# Patient Record
Sex: FEMALE | Race: WHITE | Employment: PART TIME | URBAN - METROPOLITAN AREA
[De-identification: names, ages, dates, MRNs, and addresses within clinical notes are randomized per-mention and may not be internally consistent; named-entity substitution may affect disease eponyms.]

---

## 2017-04-05 ENCOUNTER — OFFICE VISIT (OUTPATIENT)
Dept: FAMILY MEDICINE CLINIC | Age: 57
End: 2017-04-05

## 2017-04-05 VITALS
BODY MASS INDEX: 35.07 KG/M2 | HEART RATE: 85 BPM | SYSTOLIC BLOOD PRESSURE: 136 MMHG | DIASTOLIC BLOOD PRESSURE: 88 MMHG | WEIGHT: 236.8 LBS | TEMPERATURE: 98.1 F | HEIGHT: 69 IN

## 2017-04-05 DIAGNOSIS — Z11.4 SCREENING FOR HIV WITHOUT PRESENCE OF RISK FACTORS: ICD-10-CM

## 2017-04-05 DIAGNOSIS — Z00.00 WELLNESS EXAMINATION: ICD-10-CM

## 2017-04-05 DIAGNOSIS — Z12.11 SCREENING FOR COLON CANCER: ICD-10-CM

## 2017-04-05 DIAGNOSIS — R79.89 LOW VITAMIN D LEVEL: ICD-10-CM

## 2017-04-05 DIAGNOSIS — Z11.59 NEED FOR HEPATITIS C SCREENING TEST: ICD-10-CM

## 2017-04-05 DIAGNOSIS — Z23 NEED FOR PROPHYLACTIC VACCINATION AGAINST STREPTOCOCCUS PNEUMONIAE (PNEUMOCOCCUS): Primary | ICD-10-CM

## 2017-04-05 DIAGNOSIS — Z83.3 FAMILY HISTORY OF DIABETES MELLITUS TYPE I: ICD-10-CM

## 2017-04-05 PROCEDURE — 90732 PPSV23 VACC 2 YRS+ SUBQ/IM: CPT | Performed by: NURSE PRACTITIONER

## 2017-04-05 PROCEDURE — 90471 IMMUNIZATION ADMIN: CPT | Performed by: NURSE PRACTITIONER

## 2017-04-05 PROCEDURE — 99396 PREV VISIT EST AGE 40-64: CPT | Performed by: NURSE PRACTITIONER

## 2017-04-05 ASSESSMENT — ENCOUNTER SYMPTOMS
EYE DISCHARGE: 0
SORE THROAT: 0
EYE PAIN: 0
COUGH: 0
NAUSEA: 0
TROUBLE SWALLOWING: 0
PHOTOPHOBIA: 0
VOMITING: 0
CONSTIPATION: 0
BLOOD IN STOOL: 0
ABDOMINAL PAIN: 0
SHORTNESS OF BREATH: 0
DIARRHEA: 0
ABDOMINAL DISTENTION: 0

## 2017-04-12 ENCOUNTER — NURSE ONLY (OUTPATIENT)
Dept: FAMILY MEDICINE CLINIC | Age: 57
End: 2017-04-12

## 2017-04-12 ENCOUNTER — TELEPHONE (OUTPATIENT)
Dept: FAMILY MEDICINE CLINIC | Age: 57
End: 2017-04-12

## 2017-04-12 DIAGNOSIS — Z00.00 WELLNESS EXAMINATION: ICD-10-CM

## 2017-04-12 DIAGNOSIS — Z83.3 FAMILY HISTORY OF DIABETES MELLITUS TYPE I: ICD-10-CM

## 2017-04-12 DIAGNOSIS — Z00.00 WELLNESS EXAMINATION: Primary | ICD-10-CM

## 2017-04-12 LAB
ALBUMIN SERPL-MCNC: 4.3 GM/DL (ref 3.5–5.1)
ALP BLD-CCNC: 96 U/L (ref 38–126)
ALT SERPL-CCNC: 19 U/L (ref 11–66)
ANION GAP SERPL CALCULATED.3IONS-SCNC: 17 MEQ/L (ref 8–16)
AST SERPL-CCNC: 20 U/L (ref 5–40)
AVERAGE GLUCOSE: 102 MG/DL (ref 70–126)
BACTERIA: NORMAL
BANDED NEUTROPHILS ABSOLUTE COUNT: 0.1 THOU/MM3
BANDS PRESENT: 2 %
BASOPHILS # BLD: 0 %
BASOPHILS ABSOLUTE: 0 THOU/MM3 (ref 0–0.1)
BILIRUB SERPL-MCNC: 0.3 MG/DL (ref 0.3–1.2)
BILIRUBIN URINE: NEGATIVE
BLOOD, URINE: NEGATIVE
BUN BLDV-MCNC: 12 MG/DL (ref 7–22)
CALCIUM SERPL-MCNC: 9.7 MG/DL (ref 8.5–10.5)
CASTS: NORMAL /LPF
CASTS: NORMAL /LPF
CHARACTER, URINE: NORMAL
CHLORIDE BLD-SCNC: 104 MEQ/L (ref 98–111)
CHOLESTEROL, TOTAL: 245 MG/DL (ref 100–199)
CO2: 20 MEQ/L (ref 23–33)
COLOR: YELLOW
CREAT SERPL-MCNC: 0.6 MG/DL (ref 0.4–1.2)
CRYSTALS: NORMAL
DIFFERENTIAL, MANUAL: NORMAL
EOSINOPHIL # BLD: 6 %
EOSINOPHILS ABSOLUTE: 0.4 THOU/MM3 (ref 0–0.4)
EPITHELIAL CELLS, UA: NORMAL /HPF
GFR SERPL CREATININE-BSD FRML MDRD: > 90 ML/MIN/1.73M2
GLUCOSE BLD-MCNC: 97 MG/DL (ref 70–108)
GLUCOSE, URINE: NEGATIVE MG/DL
HBA1C MFR BLD: 5.4 % (ref 4.4–6.4)
HCT VFR BLD CALC: 44.3 % (ref 37–47)
HDLC SERPL-MCNC: 49 MG/DL
HEMOGLOBIN: 15.3 GM/DL (ref 12–16)
HEPATITIS C ANTIBODY: NEGATIVE
KETONES, URINE: NEGATIVE
LDL CHOLESTEROL CALCULATED: 167 MG/DL
LEUKOCYTE ESTERASE, URINE: NEGATIVE
LYMPHOCYTES # BLD: 39 %
LYMPHOCYTES ABSOLUTE: 2.3 THOU/MM3 (ref 1–4.8)
MCH RBC QN AUTO: 32.9 PG (ref 27–31)
MCHC RBC AUTO-ENTMCNC: 34.7 GM/DL (ref 33–37)
MCV RBC AUTO: 94.8 FL (ref 81–99)
MISCELLANEOUS LAB TEST RESULT: NORMAL
MONOCYTES # BLD: 0 %
MONOCYTES ABSOLUTE: 0 THOU/MM3 (ref 0.4–1.3)
NITRITE, URINE: NEGATIVE
NUCLEATED RED BLOOD CELLS: 0 /100 WBC
PDW BLD-RTO: 13.9 % (ref 11.5–14.5)
PH UA: 6
PLATELET # BLD: 266 THOU/MM3 (ref 130–400)
PLATELET ESTIMATE: ADEQUATE
PMV BLD AUTO: 9.8 MCM (ref 7.4–10.4)
POTASSIUM SERPL-SCNC: 3.9 MEQ/L (ref 3.5–5.2)
PROTEIN UA: NEGATIVE MG/DL
RBC # BLD: 4.67 MILL/MM3 (ref 4.2–5.4)
RBC # BLD: NORMAL 10*6/UL
RBC URINE: NORMAL /HPF
RENAL EPITHELIAL, UA: NORMAL
SEG NEUTROPHILS: 53 %
SEGMENTED NEUTROPHILS ABSOLUTE COUNT: 3.2 THOU/MM3 (ref 1.8–7.7)
SODIUM BLD-SCNC: 141 MEQ/L (ref 135–145)
SPECIFIC GRAVITY UA: 1.01 (ref 1–1.03)
TOTAL PROTEIN: 8 GM/DL (ref 6.1–8)
TRIGL SERPL-MCNC: 145 MG/DL (ref 0–199)
TSH SERPL DL<=0.05 MIU/L-ACNC: 1.76 MCIU/ML (ref 0.4–4.2)
UROBILINOGEN, URINE: 0.2 EU/DL
VITAMIN D 25-HYDROXY: 15 NG/ML (ref 30–100)
WBC # BLD: 6 THOU/MM3 (ref 4.8–10.8)
WBC UA: NORMAL /HPF
YEAST: NORMAL

## 2017-04-12 PROCEDURE — 36415 COLL VENOUS BLD VENIPUNCTURE: CPT | Performed by: NURSE PRACTITIONER

## 2017-04-13 ENCOUNTER — TELEPHONE (OUTPATIENT)
Dept: FAMILY MEDICINE CLINIC | Age: 57
End: 2017-04-13

## 2017-04-14 LAB — HIV-2 AB: NEGATIVE

## 2017-04-17 ENCOUNTER — TELEPHONE (OUTPATIENT)
Dept: FAMILY MEDICINE CLINIC | Age: 57
End: 2017-04-17

## 2017-08-31 ENCOUNTER — TELEPHONE (OUTPATIENT)
Dept: FAMILY MEDICINE CLINIC | Age: 57
End: 2017-08-31

## 2018-05-29 ENCOUNTER — TELEPHONE (OUTPATIENT)
Dept: FAMILY MEDICINE CLINIC | Age: 58
End: 2018-05-29

## 2018-05-29 ENCOUNTER — OFFICE VISIT (OUTPATIENT)
Dept: FAMILY MEDICINE CLINIC | Age: 58
End: 2018-05-29
Payer: COMMERCIAL

## 2018-05-29 VITALS
HEART RATE: 88 BPM | BODY MASS INDEX: 33.13 KG/M2 | WEIGHT: 231.4 LBS | DIASTOLIC BLOOD PRESSURE: 71 MMHG | HEIGHT: 70 IN | TEMPERATURE: 98 F | SYSTOLIC BLOOD PRESSURE: 125 MMHG | RESPIRATION RATE: 16 BRPM

## 2018-05-29 DIAGNOSIS — Z20.7 SCABIES EXPOSURE: Primary | ICD-10-CM

## 2018-05-29 PROCEDURE — 4004F PT TOBACCO SCREEN RCVD TLK: CPT | Performed by: NURSE PRACTITIONER

## 2018-05-29 PROCEDURE — 3017F COLORECTAL CA SCREEN DOC REV: CPT | Performed by: NURSE PRACTITIONER

## 2018-05-29 PROCEDURE — G8427 DOCREV CUR MEDS BY ELIG CLIN: HCPCS | Performed by: NURSE PRACTITIONER

## 2018-05-29 PROCEDURE — G8417 CALC BMI ABV UP PARAM F/U: HCPCS | Performed by: NURSE PRACTITIONER

## 2018-05-29 PROCEDURE — 99213 OFFICE O/P EST LOW 20 MIN: CPT | Performed by: NURSE PRACTITIONER

## 2018-05-29 RX ORDER — PERMETHRIN 50 MG/G
CREAM TOPICAL
Qty: 1 TUBE | Refills: 1 | Status: SHIPPED | OUTPATIENT
Start: 2018-05-29 | End: 2018-08-15

## 2018-05-29 ASSESSMENT — ENCOUNTER SYMPTOMS
SORE THROAT: 0
EYE REDNESS: 0
RHINORRHEA: 0
ABDOMINAL PAIN: 0
COUGH: 0
ANAL BLEEDING: 0
SHORTNESS OF BREATH: 0
BLOOD IN STOOL: 0
EYE DISCHARGE: 0
ABDOMINAL DISTENTION: 0
CONSTIPATION: 0
DIARRHEA: 0
NAUSEA: 0
COLOR CHANGE: 0

## 2018-05-29 ASSESSMENT — PATIENT HEALTH QUESTIONNAIRE - PHQ9
2. FEELING DOWN, DEPRESSED OR HOPELESS: 0
SUM OF ALL RESPONSES TO PHQ QUESTIONS 1-9: 0
1. LITTLE INTEREST OR PLEASURE IN DOING THINGS: 0
SUM OF ALL RESPONSES TO PHQ9 QUESTIONS 1 & 2: 0

## 2018-08-15 ENCOUNTER — OFFICE VISIT (OUTPATIENT)
Dept: FAMILY MEDICINE CLINIC | Age: 58
End: 2018-08-15
Payer: COMMERCIAL

## 2018-08-15 VITALS
SYSTOLIC BLOOD PRESSURE: 110 MMHG | TEMPERATURE: 98.2 F | DIASTOLIC BLOOD PRESSURE: 77 MMHG | HEART RATE: 97 BPM | WEIGHT: 235 LBS | RESPIRATION RATE: 16 BRPM | BODY MASS INDEX: 37.77 KG/M2 | HEIGHT: 66 IN

## 2018-08-15 DIAGNOSIS — Z00.00 WELL ADULT EXAM: Primary | ICD-10-CM

## 2018-08-15 DIAGNOSIS — F17.210 CIGARETTE NICOTINE DEPENDENCE, UNCOMPLICATED: ICD-10-CM

## 2018-08-15 DIAGNOSIS — Z87.891 PERSONAL HISTORY OF TOBACCO USE: ICD-10-CM

## 2018-08-15 DIAGNOSIS — Z11.59 ENCOUNTER FOR HEPATITIS C SCREENING TEST FOR LOW RISK PATIENT: ICD-10-CM

## 2018-08-15 DIAGNOSIS — Z11.4 ENCOUNTER FOR SCREENING FOR HIV: ICD-10-CM

## 2018-08-15 DIAGNOSIS — Z13.1 DIABETES MELLITUS SCREENING: ICD-10-CM

## 2018-08-15 DIAGNOSIS — Z85.41 HX OF CERVICAL CANCER: ICD-10-CM

## 2018-08-15 DIAGNOSIS — Z13.220 SCREENING CHOLESTEROL LEVEL: ICD-10-CM

## 2018-08-15 DIAGNOSIS — Z01.419 ENCOUNTER FOR GYNECOLOGICAL EXAMINATION WITHOUT ABNORMAL FINDING: ICD-10-CM

## 2018-08-15 DIAGNOSIS — Z12.31 ENCOUNTER FOR SCREENING MAMMOGRAM FOR BREAST CANCER: ICD-10-CM

## 2018-08-15 PROCEDURE — 99396 PREV VISIT EST AGE 40-64: CPT | Performed by: FAMILY MEDICINE

## 2018-08-15 PROCEDURE — G0296 VISIT TO DETERM LDCT ELIG: HCPCS | Performed by: FAMILY MEDICINE

## 2018-08-15 RX ORDER — VARENICLINE TARTRATE 25 MG
KIT ORAL
Qty: 1 EACH | Refills: 0 | Status: SHIPPED | OUTPATIENT
Start: 2018-08-15 | End: 2019-08-21 | Stop reason: ALTCHOICE

## 2018-08-15 RX ORDER — VARENICLINE TARTRATE 1 MG/1
1 TABLET, FILM COATED ORAL 2 TIMES DAILY
Qty: 60 TABLET | Refills: 3 | Status: SHIPPED | OUTPATIENT
Start: 2018-08-15 | End: 2019-08-21 | Stop reason: ALTCHOICE

## 2018-08-15 NOTE — PROGRESS NOTES
[facility] [facility] [address] [facility], [address]  Ph:   [phone]  Fax: [phone]    Provider:  Dr. Gurpreet Dai Visit    Patient:  Gabriella Mitchell  YOB: 1960      Visit Date:  8/15/2018    Subjective:  Review of Systems   All other systems reviewed and are negative. Health Habits: With regard to her health habits, she eats 1 meal and several snack per day. She does not exercise regularly:   Physical activities include: walking,swimming 2 times per week, 90 minutes/day. She does take over the counter vitamins. She never had a blood transfusion or tattoo. She wears seatbelts while riding a car. She does not text or talk on the phone while driving. She performs all of her ADL's without problem. She is independent, she cooks, drives, bathes, and gets dressed without assistance. She is . She has 3 children. She does work. She works 30 hours a week. She is happy with her life. She rates her stress level a 2 in a scale 1-10. Health Maintenance   Topic Date Due    Cervical cancer screen  09/13/1983    Shingles Vaccine (1 of 2 - 2 Dose Series) 09/13/2012    Low dose CT lung screening  09/13/2017    Breast cancer screen  01/30/2018    Flu vaccine (1) 09/01/2018    Lipid screen  04/12/2022    DTaP/Tdap/Td vaccine (2 - Td) 08/06/2026    Colon cancer screen colonoscopy  12/02/2027    Pneumococcal med risk  Completed    Hepatitis C screen  Completed    HIV screen  Completed       She  reports that she has been smoking Cigarettes. She has a 40.00 pack-year smoking history. She has never used smokeless tobacco. She reports that she drinks alcohol. She reports that she does not use drugs. .  Her last pap smear was 2  years and it was normal. Her last menstrual cycle was 5 years ago. She is sexually active. She has had the same sexual partner for the last 26 years. She does perform regular breast self exams.      Low Dose CT (LDCT) Lung Screening vulva, vagina, cervix, uterus and adnexa. Rectal - normal rectal, no masses, guaiac negative stool obtained, normal sphincter tone  Neurological - alert, oriented, normal speech, no focal findings or movement disorder noted  Extremities - peripheral pulses normal, no pedal edema, no clubbing or cyanosis  Skin - normal coloration and turgor, no rashes, no suspicious skin lesions noted    Assessment:   Diagnosis Orders   1. Well adult exam     2. Personal history of tobacco use  ID VISIT TO DISCUSS LUNG CA SCREEN W LDCT    CT Lung Screening (Annual)   3. Cigarette nicotine dependence, uncomplicated     4. Diabetes mellitus screening     5. Screening cholesterol level  Lipid Panel   6. Encounter for screening for HIV  HIV Screen   7. Encounter for screening mammogram for breast cancer  MAMMO MAMMOGRAM DIGITAL DIAGNOSTIC   8. Encounter for hepatitis C screening test for low risk patient  Hepatitis C Antibody       Plan:    Return in about 1 year (around 8/15/2019) for Wellness Visit. .    Counseling was provided today regarding the following topics:  Healthy eating habits, Low cholesterol diet, Vitamin/Mineral supplementation, Regular exercise, and Breast self exam.      Addi Alvarez MD

## 2018-08-20 LAB — CYTOLOGY THIN PREP PAP: NORMAL

## 2018-08-30 ENCOUNTER — TELEPHONE (OUTPATIENT)
Dept: FAMILY MEDICINE CLINIC | Age: 58
End: 2018-08-30

## 2018-09-26 ENCOUNTER — HOSPITAL ENCOUNTER (OUTPATIENT)
Dept: CT IMAGING | Age: 58
Discharge: HOME | End: 2018-09-26
Payer: COMMERCIAL

## 2018-09-26 DIAGNOSIS — Z87.891 PERSONAL HISTORY OF TOBACCO USE: ICD-10-CM

## 2018-09-26 PROCEDURE — G0297 LDCT FOR LUNG CA SCREEN: HCPCS

## 2018-09-28 ENCOUNTER — TELEPHONE (OUTPATIENT)
Dept: FAMILY MEDICINE CLINIC | Age: 58
End: 2018-09-28

## 2018-09-28 DIAGNOSIS — R91.8 ABNORMAL CT LUNG SCREENING: Primary | ICD-10-CM

## 2018-09-28 NOTE — TELEPHONE ENCOUNTER
----- Message from Devorah Pinedo MD sent at 9/28/2018 12:23 PM EDT -----  CT lung screen showed Benign appearance or behavior, continue annual screening. There were a few groundglass nodular densities throughout the right lung, the largest measuring up to 6 mm. Not sure the meaning, seems benign.   Will refer to pulmonary for a second opinion

## 2018-09-28 NOTE — TELEPHONE ENCOUNTER
Called and LM advising patient on result note. Advised if any questions to contact office. REFERRAL PLACED TO A [facility] DOCTOR PER PATIENT REQUEST.

## 2018-10-05 NOTE — PROGRESS NOTES
Heart Disease Mother         chf    Diabetes Father     Heart Attack Father 80    Cancer Sister 46        breast cancer- spread    Breast Cancer Sister 58        recurrence age 72    Diabetes Brother         Type 2    Cervical Cancer Sister 32    Cervical Cancer Other 32    Cervical Cancer Child 28    Colon Cancer Neg Hx     Colon Polyps Neg Hx            Physical Exam     VITALS:  /73 (Site: Left Upper Arm, Position: Sitting)   Pulse 68   Temp 98.2 °F (36.8 °C) (Oral)   Ht 5' 9\" (1.753 m)   Wt 234 lb 9.6 oz (106.4 kg)   SpO2 95%   BMI 34.64 kg/m²   Nursing note and vitals reviewed. Constitutional: Patient appears moderately built and moderately nourished. No distress. Patient is oriented to person, place, and time. HENT:   Head: Normocephalic and atraumatic. Right Ear: External ear normal.   Left Ear: External ear normal.   Mouth/Throat: Oropharynx is clear and moist.  No oral thrush. Eyes: Conjunctivae are normal. Pupils are equal, round, and reactive to light. No scleral icterus. Neck: Neck supple. No JVD present. No tracheal deviation present. Cardiovascular: Normal rate, regular rhythm, normal heart sounds. No murmur heard. Pulmonary/Chest: Effort normal and breath sounds normal. No stridor. No respiratory distress. Bilateral expiratory wheezes. No rales. Patient exhibits no tenderness. Abdominal: Soft. Patient exhibits no distension. No tenderness. Musculoskeletal: Normal range of motion. Extremities: Patient exhibits no edema and no tenderness. Lymphadenopathy:  No cervical adenopathy. Neurological: Patient is alert and oriented to person, place, and time. Skin: Skin is warm and dry. Patient is not diaphoretic. Psychiatric: Patient  has a normal mood and affect. Patient behavior is normal.     Neck Circumference -   15   Mallampati - 1    Diagnostic Data:    Radiological Data:    Low dose CT chest:  Sep 26, 2018  NONCONTRAST SCREENING CT CHEST:  1.  There are a

## 2018-10-09 ENCOUNTER — OFFICE VISIT (OUTPATIENT)
Dept: PULMONOLOGY | Age: 58
End: 2018-10-09
Payer: COMMERCIAL

## 2018-10-09 VITALS
BODY MASS INDEX: 34.75 KG/M2 | SYSTOLIC BLOOD PRESSURE: 114 MMHG | TEMPERATURE: 98.2 F | HEIGHT: 69 IN | DIASTOLIC BLOOD PRESSURE: 73 MMHG | WEIGHT: 234.6 LBS | HEART RATE: 68 BPM | OXYGEN SATURATION: 95 %

## 2018-10-09 DIAGNOSIS — R91.1 INCIDENTAL LUNG NODULE, > 3MM AND < 8MM: Primary | ICD-10-CM

## 2018-10-09 DIAGNOSIS — Z72.0 TOBACCO ABUSE: ICD-10-CM

## 2018-10-09 DIAGNOSIS — R05.3 CHRONIC COUGH: ICD-10-CM

## 2018-10-09 DIAGNOSIS — R93.89 ABNORMAL CT OF THE CHEST: ICD-10-CM

## 2018-10-09 PROCEDURE — 99204 OFFICE O/P NEW MOD 45 MIN: CPT | Performed by: INTERNAL MEDICINE

## 2018-10-09 PROCEDURE — 3017F COLORECTAL CA SCREEN DOC REV: CPT | Performed by: INTERNAL MEDICINE

## 2018-10-09 PROCEDURE — G8484 FLU IMMUNIZE NO ADMIN: HCPCS | Performed by: INTERNAL MEDICINE

## 2018-10-09 PROCEDURE — G8417 CALC BMI ABV UP PARAM F/U: HCPCS | Performed by: INTERNAL MEDICINE

## 2018-10-09 PROCEDURE — G8427 DOCREV CUR MEDS BY ELIG CLIN: HCPCS | Performed by: INTERNAL MEDICINE

## 2018-10-09 PROCEDURE — 4004F PT TOBACCO SCREEN RCVD TLK: CPT | Performed by: INTERNAL MEDICINE

## 2019-04-22 ENCOUNTER — TELEPHONE (OUTPATIENT)
Dept: PULMONOLOGY | Age: 59
End: 2019-04-22

## 2019-08-21 ENCOUNTER — OFFICE VISIT (OUTPATIENT)
Dept: FAMILY MEDICINE CLINIC | Age: 59
End: 2019-08-21
Payer: COMMERCIAL

## 2019-08-21 VITALS
BODY MASS INDEX: 33.56 KG/M2 | DIASTOLIC BLOOD PRESSURE: 70 MMHG | RESPIRATION RATE: 12 BRPM | WEIGHT: 234.4 LBS | HEIGHT: 70 IN | HEART RATE: 94 BPM | SYSTOLIC BLOOD PRESSURE: 138 MMHG | TEMPERATURE: 98.8 F

## 2019-08-21 DIAGNOSIS — Z13.220 LIPID SCREENING: ICD-10-CM

## 2019-08-21 DIAGNOSIS — R91.8 PULMONARY NODULES: ICD-10-CM

## 2019-08-21 DIAGNOSIS — Z00.00 WELLNESS EXAMINATION: Primary | ICD-10-CM

## 2019-08-21 DIAGNOSIS — Z13.1 DIABETES MELLITUS SCREENING: ICD-10-CM

## 2019-08-21 PROCEDURE — 90472 IMMUNIZATION ADMIN EACH ADD: CPT | Performed by: FAMILY MEDICINE

## 2019-08-21 PROCEDURE — 99396 PREV VISIT EST AGE 40-64: CPT | Performed by: FAMILY MEDICINE

## 2019-08-21 PROCEDURE — 90471 IMMUNIZATION ADMIN: CPT | Performed by: FAMILY MEDICINE

## 2019-08-21 PROCEDURE — 90670 PCV13 VACCINE IM: CPT | Performed by: FAMILY MEDICINE

## 2019-08-21 PROCEDURE — 90750 HZV VACC RECOMBINANT IM: CPT | Performed by: FAMILY MEDICINE

## 2019-08-21 RX ORDER — VARENICLINE TARTRATE 25 MG
KIT ORAL
Qty: 1 BOX | Refills: 0 | Status: SHIPPED | OUTPATIENT
Start: 2019-08-21 | End: 2020-02-19

## 2019-08-21 RX ORDER — VARENICLINE TARTRATE 1 MG/1
1 TABLET, FILM COATED ORAL 2 TIMES DAILY
Qty: 180 TABLET | Refills: 1 | Status: SHIPPED | OUTPATIENT
Start: 2019-08-21 | End: 2020-02-19

## 2019-08-21 ASSESSMENT — PATIENT HEALTH QUESTIONNAIRE - PHQ9
SUM OF ALL RESPONSES TO PHQ QUESTIONS 1-9: 0
1. LITTLE INTEREST OR PLEASURE IN DOING THINGS: 0
2. FEELING DOWN, DEPRESSED OR HOPELESS: 0
SUM OF ALL RESPONSES TO PHQ QUESTIONS 1-9: 0
SUM OF ALL RESPONSES TO PHQ9 QUESTIONS 1 & 2: 0

## 2019-08-21 NOTE — PROGRESS NOTES
Immunizations Administered     Name Date Dose Route    Pneumococcal Conjugate 13-valent ([id number]) 8/21/2019 0.5 mL Intramuscular    Site: Deltoid- Left    Lot: [id number]    NDC: 7961-1399-93    Zoster Recombinant (Shingrix) 8/21/2019 0.5 mL Intramuscular    Site: Deltoid- Right    Lot: [id number]    NDC: 87472-010-19
screening  Glucose, Fasting   4. Pulmonary nodules  CT CHEST WO CONTRAST    Full PFT Study With Bronchodilator       Plan:    Return in about 6 months (around 2/21/2020). .    Counseling was provided today regarding the following topics:  Healthy eating habits and snacks, talking or texting while driving, vitamin/Mineral supplementation, vitamin D and calcium intake, regular exercise, and breast self exam. Written information has been provided.          Wilfrid Frye MD

## 2019-08-30 ENCOUNTER — HOSPITAL ENCOUNTER (OUTPATIENT)
Dept: PULMONOLOGY | Age: 59
Discharge: HOME | End: 2019-08-30
Payer: COMMERCIAL

## 2019-08-30 ENCOUNTER — HOSPITAL ENCOUNTER (OUTPATIENT)
Dept: CT IMAGING | Age: 59
Discharge: HOME | End: 2019-08-30
Payer: COMMERCIAL

## 2019-08-30 DIAGNOSIS — R91.8 PULMONARY NODULES: ICD-10-CM

## 2019-08-30 PROCEDURE — 94726 PLETHYSMOGRAPHY LUNG VOLUMES: CPT

## 2019-08-30 PROCEDURE — 94010 BREATHING CAPACITY TEST: CPT

## 2019-08-30 PROCEDURE — 71250 CT THORAX DX C-: CPT

## 2019-08-30 PROCEDURE — 94729 DIFFUSING CAPACITY: CPT

## 2019-09-03 ENCOUNTER — TELEPHONE (OUTPATIENT)
Dept: FAMILY MEDICINE CLINIC | Age: 59
End: 2019-09-03

## 2019-09-06 ENCOUNTER — TELEPHONE (OUTPATIENT)
Dept: FAMILY MEDICINE CLINIC | Age: 59
End: 2019-09-06

## 2020-02-19 ENCOUNTER — OFFICE VISIT (OUTPATIENT)
Dept: FAMILY MEDICINE CLINIC | Age: 60
End: 2020-02-19
Payer: COMMERCIAL

## 2020-02-19 VITALS
BODY MASS INDEX: 32.38 KG/M2 | HEIGHT: 70 IN | DIASTOLIC BLOOD PRESSURE: 80 MMHG | WEIGHT: 226.2 LBS | SYSTOLIC BLOOD PRESSURE: 118 MMHG | TEMPERATURE: 98.1 F | RESPIRATION RATE: 12 BRPM | HEART RATE: 75 BPM

## 2020-02-19 PROCEDURE — 3017F COLORECTAL CA SCREEN DOC REV: CPT | Performed by: FAMILY MEDICINE

## 2020-02-19 PROCEDURE — 90750 HZV VACC RECOMBINANT IM: CPT | Performed by: FAMILY MEDICINE

## 2020-02-19 PROCEDURE — 4004F PT TOBACCO SCREEN RCVD TLK: CPT | Performed by: FAMILY MEDICINE

## 2020-02-19 PROCEDURE — G8417 CALC BMI ABV UP PARAM F/U: HCPCS | Performed by: FAMILY MEDICINE

## 2020-02-19 PROCEDURE — G8427 DOCREV CUR MEDS BY ELIG CLIN: HCPCS | Performed by: FAMILY MEDICINE

## 2020-02-19 PROCEDURE — 99213 OFFICE O/P EST LOW 20 MIN: CPT | Performed by: FAMILY MEDICINE

## 2020-02-19 PROCEDURE — 90471 IMMUNIZATION ADMIN: CPT | Performed by: FAMILY MEDICINE

## 2020-02-19 PROCEDURE — G8484 FLU IMMUNIZE NO ADMIN: HCPCS | Performed by: FAMILY MEDICINE

## 2020-02-19 SDOH — ECONOMIC STABILITY: FOOD INSECURITY: WITHIN THE PAST 12 MONTHS, YOU WORRIED THAT YOUR FOOD WOULD RUN OUT BEFORE YOU GOT MONEY TO BUY MORE.: PATIENT DECLINED

## 2020-02-19 SDOH — ECONOMIC STABILITY: TRANSPORTATION INSECURITY
IN THE PAST 12 MONTHS, HAS LACK OF TRANSPORTATION KEPT YOU FROM MEETINGS, WORK, OR FROM GETTING THINGS NEEDED FOR DAILY LIVING?: PATIENT DECLINED

## 2020-02-19 SDOH — ECONOMIC STABILITY: FOOD INSECURITY: WITHIN THE PAST 12 MONTHS, THE FOOD YOU BOUGHT JUST DIDN'T LAST AND YOU DIDN'T HAVE MONEY TO GET MORE.: PATIENT DECLINED

## 2020-02-19 SDOH — ECONOMIC STABILITY: INCOME INSECURITY: HOW HARD IS IT FOR YOU TO PAY FOR THE VERY BASICS LIKE FOOD, HOUSING, MEDICAL CARE, AND HEATING?: PATIENT DECLINED

## 2020-02-19 SDOH — ECONOMIC STABILITY: TRANSPORTATION INSECURITY
IN THE PAST 12 MONTHS, HAS THE LACK OF TRANSPORTATION KEPT YOU FROM MEDICAL APPOINTMENTS OR FROM GETTING MEDICATIONS?: PATIENT DECLINED

## 2020-02-19 ASSESSMENT — PATIENT HEALTH QUESTIONNAIRE - PHQ9
SUM OF ALL RESPONSES TO PHQ QUESTIONS 1-9: 0
SUM OF ALL RESPONSES TO PHQ QUESTIONS 1-9: 0
SUM OF ALL RESPONSES TO PHQ9 QUESTIONS 1 & 2: 0
1. LITTLE INTEREST OR PLEASURE IN DOING THINGS: 0
2. FEELING DOWN, DEPRESSED OR HOPELESS: 0

## 2020-02-19 NOTE — PROGRESS NOTES
[facility] [address], [address]  Ph:   [phone]  Fax: [phone]    Provider:  Dr. Bart Josue    Patient:  Shelly Lua  YOB: 1960      Visit Date:  2/19/2020     Reason For Visit:   Chief Complaint   Patient presents with    [address]        Nikolas Chacon is a 64 y.o. female who comes today to the office for her second Shingrix vaccine. Last time I saw her was for her wellness visit and she got orders for her routine screening test.  She has not done them yet. Her pap smear showed insufficient tissue, but the HPV came back negative. She is a smoker  She smokes a PPD. She started smoking at age 15. Last CT lung screening showed groundglass nodules and the repeat one in August 2019 showed stable groundglass nodules compared to prior CT and it was recommended resumption of annual screening. She is postmenopausal and smoker, so we ordered a Bone density which has not been done yet. Last mammogram was done 4 years ago and it was abnormal and subsequent biopsy was negative. She did not do the surveillance one next year. Significant Past Medical History:    Past Medical History:   Diagnosis Date    Cancer [facility]) about 24 yrs ago    had cervical cancer- surgery in [address] [place] [address] [place] Sessile colonic polyp 12/2017    Sessile rectal polyp 12/2017           Allergies:  has No Known Allergies. Medications:   No current outpatient medications on file. No current facility-administered medications for this visit.         Review of systems:  Review of Systems - History obtained from chart review and the patient  General ROS: negative for - chills, fatigue or fever  Psychological ROS: negative for - anxiety, depression or sleep disturbances  ENT ROS: negative for - headaches, nasal congestion or nasal discharge  Allergy and Immunology ROS: negative for - seasonal allergies  Hematological and Lymphatic ROS: negative for - bruising  Endocrine ROS: negative for - malaise/lethargy, polydipsia/polyuria or temperature intolerance  Respiratory ROS: negative for - cough,  shortness of breath or wheezing  Cardiovascular ROS: negative for - chest pain or edema  Gastrointestinal ROS: negative for - abdominal pain, constipation, diarrhea or nausea/vomiting  Musculoskeletal ROS: negative for - joint pain or muscle pain  Neurological ROS: negative for - dizziness  Dermatological ROS: negative for - rash    Physical Examination:  /80 (Site: Left Upper Arm, Position: Sitting, Cuff Size: Large Adult)   Pulse 75   Temp 98.1 °F (36.7 °C) (Oral)   Resp 12   Ht 5' 9.5\" (1.765 m)   Wt 226 lb 3.2 oz (102.6 kg)   BMI 32.92 kg/m²     General-  Alert and oriented x 3, NAD  HEENT: NC, AT, PERRLA, EOMI, anicteric sclerae  Ears: Normal tympanic membranes bilaterally  Nose: patent, no lesions  Mouth: no lesions, moist mucosas  Neck - supple, no significant adenopathy  Chest - clear to auscultation, no wheezes, rales or rhonchi, symmetric air entry  Heart - normal rate, regular rhythm, normal S1, S2, no murmurs, rubs, clicks or gallops  Abdomen - soft, nontender, nondistended, no masses or organomegaly  Extremities - peripheral pulses normal, no pedal edema, no clubbing or cyanosis    Impression:   Diagnosis Orders   1. Lipid screening     2. Diabetes mellitus screening     3. Smoker unmotivated to quit     4. BMI 32.0-32.9,adult     5. Screening for osteoporosis  DEXA BONE DENSITY 2 SITES   6. Post-menopausal  DEXA BONE DENSITY 2 SITES   7. Screening for breast cancer  CURTIS DIGITAL SCREEN W CAD BILATERAL       Plan:      Personalized Results: This tool cannot accurately calculate risk for women with a medical history of breast cancer, DCIS or LCIS. Other tools may be more appropriate for women with known mutations in either the BRCA1 or BRCA  2 gene, or other hereditary syndromes associated with higher risks of breast cancer.  See Other Risk Assessment Tools for more

## 2020-02-19 NOTE — PATIENT INSTRUCTIONS
Personalized Results: This tool cannot accurately calculate risk for women with a medical history of breast cancer, DCIS or LCIS. Other tools may be more appropriate for women with known mutations in either the BRCA1 or BRCA  2 gene, or other hereditary syndromes associated with higher risks of breast cancer. See Other Risk Assessment Tools for more information. Although a patient's risk may be accurately estimated, these predictions do not allow one to say precisely  which woman will develop breast cancer. Some women who do not develop breast cancer have higher risk estimates than those who do develop the disease. This tool was designed for use by health professionals. If you are not a health professional, you are encouraged to print these results and discuss them with your provider. The answers provided were used to estimate absolute risk of developing invasive breast cancer during the next 5-year period and up to age 80 (lifetime risk). Factors included: patient's personal medical and reproductive history and the history of breast cancer                    among her first-degree relatives (mother, sisters, daughters). For comparison, the tool also presents 5-year and lifetime risk estimates for a woman of the same age and race/ethnicity who is at average risk for developing breast cancer. Risk of Developing Breast Cancer Based on the information provided, the patient's estimated risk for developing invasive breast cancer over the next 5 years is 3.6%, presented in red since hers is higher than the average risk of 1.6% (presented in blue) for women of the same age and race/ethnicity in the general [place] population. Lifetime Risk of Developing Breast Cancer >20.4%.  Based on the information provided, the woman's estimated risk for developing invasive breast cancer over her lifetime (to age 80) is 20.4%, presented in red since hers is higher than the average risk of 9.8% (presented in blue) for women of the

## 2020-02-19 NOTE — PROGRESS NOTES
Immunizations Administered     Name Date Dose Route    Zoster Recombinant (Shingrix) 2/19/2020 0.5 mL Intramuscular    Site: Deltoid- Left    Lot: [id number] Slade: [id number]

## 2020-03-04 ENCOUNTER — HOSPITAL ENCOUNTER (OUTPATIENT)
Dept: WOMENS IMAGING | Age: 60
Discharge: HOME | End: 2020-03-04
Payer: COMMERCIAL

## 2020-03-04 PROCEDURE — 77080 DXA BONE DENSITY AXIAL: CPT

## 2020-03-04 PROCEDURE — 77063 BREAST TOMOSYNTHESIS BI: CPT

## 2020-03-06 ENCOUNTER — TELEPHONE (OUTPATIENT)
Dept: FAMILY MEDICINE CLINIC | Age: 60
End: 2020-03-06

## 2020-03-06 NOTE — TELEPHONE ENCOUNTER
----- Message from Brandt Mohs, MD sent at 3/5/2020  3:06 PM EST -----  Bone density showed that she has osteoporosis placing her at high risk for fracture.   Please do comprehensive panel, phosphorus, vitamin D, CBC and bring her for follow-up to discuss her options diagnosis: osteoporosis

## 2020-03-16 ENCOUNTER — NURSE ONLY (OUTPATIENT)
Dept: LAB | Age: 60
End: 2020-03-16

## 2020-03-16 LAB
ALBUMIN SERPL-MCNC: 4.3 G/DL (ref 3.5–5.1)
ALP BLD-CCNC: 80 U/L (ref 38–126)
ALT SERPL-CCNC: 19 U/L (ref 11–66)
ANION GAP SERPL CALCULATED.3IONS-SCNC: 13 MEQ/L (ref 8–16)
AST SERPL-CCNC: 26 U/L (ref 5–40)
BASOPHILS # BLD: 0.7 %
BASOPHILS ABSOLUTE: 0 THOU/MM3 (ref 0–0.1)
BILIRUB SERPL-MCNC: 0.2 MG/DL (ref 0.3–1.2)
BUN BLDV-MCNC: 16 MG/DL (ref 7–22)
CALCIUM SERPL-MCNC: 9.5 MG/DL (ref 8.5–10.5)
CHLORIDE BLD-SCNC: 105 MEQ/L (ref 98–111)
CO2: 20 MEQ/L (ref 23–33)
CREAT SERPL-MCNC: 0.6 MG/DL (ref 0.4–1.2)
EOSINOPHIL # BLD: 2.3 %
EOSINOPHILS ABSOLUTE: 0.2 THOU/MM3 (ref 0–0.4)
ERYTHROCYTE [DISTWIDTH] IN BLOOD BY AUTOMATED COUNT: 13.2 % (ref 11.5–14.5)
ERYTHROCYTE [DISTWIDTH] IN BLOOD BY AUTOMATED COUNT: 48.4 FL (ref 35–45)
GFR SERPL CREATININE-BSD FRML MDRD: > 90 ML/MIN/1.73M2
GLUCOSE BLD-MCNC: 104 MG/DL (ref 70–108)
HCT VFR BLD CALC: 46.9 % (ref 37–47)
HEMOGLOBIN: 15.2 GM/DL (ref 12–16)
IMMATURE GRANS (ABS): 0.01 THOU/MM3 (ref 0–0.07)
IMMATURE GRANULOCYTES: 0.1 %
LYMPHOCYTES # BLD: 33.9 %
LYMPHOCYTES ABSOLUTE: 2.3 THOU/MM3 (ref 1–4.8)
MCH RBC QN AUTO: 32.3 PG (ref 26–33)
MCHC RBC AUTO-ENTMCNC: 32.4 GM/DL (ref 32.2–35.5)
MCV RBC AUTO: 99.8 FL (ref 81–99)
MONOCYTES # BLD: 4.8 %
MONOCYTES ABSOLUTE: 0.3 THOU/MM3 (ref 0.4–1.3)
NUCLEATED RED BLOOD CELLS: 0 /100 WBC
PHOSPHORUS: 4.1 MG/DL (ref 2.4–4.7)
PLATELET # BLD: 284 THOU/MM3 (ref 130–400)
PMV BLD AUTO: 12.1 FL (ref 9.4–12.4)
POTASSIUM SERPL-SCNC: 5 MEQ/L (ref 3.5–5.2)
RBC # BLD: 4.7 MILL/MM3 (ref 4.2–5.4)
SEG NEUTROPHILS: 58.2 %
SEGMENTED NEUTROPHILS ABSOLUTE COUNT: 4 THOU/MM3 (ref 1.8–7.7)
SODIUM BLD-SCNC: 138 MEQ/L (ref 135–145)
TOTAL PROTEIN: 7.9 G/DL (ref 6.1–8)
VITAMIN D 25-HYDROXY: 24 NG/ML (ref 30–100)
WBC # BLD: 6.9 THOU/MM3 (ref 4.8–10.8)

## 2020-03-17 ENCOUNTER — TELEPHONE (OUTPATIENT)
Dept: FAMILY MEDICINE CLINIC | Age: 60
End: 2020-03-17

## 2020-03-17 NOTE — TELEPHONE ENCOUNTER
----- Message from Linda Castro MD sent at 3/16/2020  4:45 PM EDT -----  Most blood test within acceptable ranges.

## 2020-03-17 NOTE — TELEPHONE ENCOUNTER
----- Message from Kandis Melendez MD sent at 3/16/2020  4:44 PM EDT -----  Vitamin D is better it went to 24 from 15 but he still needs to be higher above 30.   She needs to take vitamin D3 2000 international units every day will repeat vitamin D levels in 3 months

## 2020-07-07 ENCOUNTER — VIRTUAL VISIT (OUTPATIENT)
Dept: FAMILY MEDICINE CLINIC | Age: 60
End: 2020-07-07

## 2020-07-07 PROCEDURE — 99213 OFFICE O/P EST LOW 20 MIN: CPT | Performed by: FAMILY MEDICINE

## 2020-07-07 PROCEDURE — G8428 CUR MEDS NOT DOCUMENT: HCPCS | Performed by: FAMILY MEDICINE

## 2020-07-07 PROCEDURE — 3017F COLORECTAL CA SCREEN DOC REV: CPT | Performed by: FAMILY MEDICINE

## 2020-07-07 RX ORDER — ALENDRONATE SODIUM 70 MG/1
70 TABLET ORAL
Qty: 12 TABLET | Refills: 1 | Status: SHIPPED | OUTPATIENT
Start: 2020-07-07 | End: 2021-11-15

## 2020-07-07 NOTE — PROGRESS NOTES
2020    TELEHEALTH EVALUATION -- Audio/Visual (During NO- public health emergency)    HPI:    Azalia Martinez (:  1960) has requested an audio/video evaluation for the following concern(s):    Bone density performed in 2020 showed osteoporosis placing her at high risk for fracture.  I did blood test, comprehensive panel, phosphorus, vitamin D, CBC. All levels within acceptable ranges, except for vitamin D was low to 24. Review of Systems   Negative for fever chills. Negative for nausea vomiting. Negative for chest pain or shortness of breath. Prior to Visit Medications    Medication Sig Taking? Authorizing Provider   alendronate (FOSAMAX) 70 MG tablet Take 1 tablet by mouth every 7 days Yes David Dunne MD       Social History     Tobacco Use    Smoking status: Current Every Day Smoker     Packs/day: 1.00     Years: 40.00     Pack years: 40.00     Types: Cigarettes    Smokeless tobacco: Never Used    Tobacco comment: has tried vapor cigs   Substance Use Topics    Alcohol use: Yes     Comment: rare    Drug use: No        Past Medical History:   Diagnosis Date    Cancer ([facility] [address] about 24 yrs ago    had cervical cancer- surgery in [place]    Sessile colonic polyp 2017    Sessile rectal polyp 2017       PHYSICAL EXAMINATION:      Constitutional: [x] Appears well-developed and well-nourished [x] No apparent distress       Mental status  [x] Alert and awake  [x] Oriented to person/place/time [x]Able to follow commands      Eyes:  EOM    [x]  Normal   Sclera  [x]  Normal      Discharge [x]  None visible     HENT:   [x] Normocephalic, atraumatic.     [x] Mouth/Throat: Mucous membranes are moist.     External Ears [x] Normal      Neck: [x] No visualized mass     Pulmonary/Chest: [x] Respiratory effort normal.  [x] No visualized signs of difficulty breathing or respiratory distress     Musculoskeletal:         [x] Normal range of motion of neck          Neurological:        [x] No Facial Asymmetry (Cranial nerve 7 motor function) (limited exam to video visit)           Skin:        [x] No significant exanthematous lesions or discoloration noted on facial skin               Psychiatric:       [x] Normal Affect          ASSESSMENT/PLAN:    1. Osteoporosis, unspecified osteoporosis type, unspecified pathological fracture presence  Start Fosamax 70 mg 1 tablet by mouth weekly. She has been advised to take it on empty stomach, first thing in the morning, with a large glass of water and not to lay back for the next 2 hours. Increase calcium to 1200 mg daily. Increase vitamin D to 2000 international units every day. 2. Vitamin D deficiency  Vitamin D rich diet. Vitamin D 2000 thousand units every day  Check vitamin D level in 3 months      Return in about 3 months (around 10/7/2020) for Routine Follow-Up osteoporosis. Steve Morley is a 64 y.o. female being evaluated by a Virtual Visit (video visit) encounter to address concerns as mentioned above. A caregiver was present when appropriate. Due to this being a TeleHealth encounter (During VLW-02 public health emergency), evaluation of the following organ systems was limited: Vitals/Constitutional/EENT/Resp/CV/GI//MS/Neuro/Skin/Heme-Lymph-Imm. Pursuant to the emergency declaration under the [address] [place] [address] authority and the [employer] Act, this Virtual Visit was conducted with patient's (and/or legal guardian's) consent, to reduce the patient's risk of exposure to COVID-19 and provide necessary medical care. The patient (and/or legal guardian) has also been advised to contact this office for worsening conditions or problems, and seek emergency medical treatment and/or call 911 if deemed necessary.      Patient identification was verified at the start of the visit: Yes    Total time spent on this encounter: Not billed by time    Services were provided through a video synchronous discussion virtually to substitute for in-person clinic visit. Patient and provider were located at their individual homes. --Maricarmen Sheehan MD on 7/7/2020 at 11:34 AM    An electronic signature was used to authenticate this note.

## 2020-08-24 ENCOUNTER — VIRTUAL VISIT (OUTPATIENT)
Dept: FAMILY MEDICINE CLINIC | Age: 60
End: 2020-08-24
Payer: COMMERCIAL

## 2020-08-24 PROCEDURE — 99396 PREV VISIT EST AGE 40-64: CPT | Performed by: FAMILY MEDICINE

## 2020-09-24 ENCOUNTER — NURSE TRIAGE (OUTPATIENT)
Dept: OTHER | Facility: CLINIC | Age: 60
End: 2020-09-24

## 2020-09-24 ENCOUNTER — TELEPHONE (OUTPATIENT)
Dept: FAMILY MEDICINE CLINIC | Age: 60
End: 2020-09-24

## 2020-09-24 NOTE — TELEPHONE ENCOUNTER
Reason for Disposition   [1] COVID-19 infection suspected by caller or triager AND [2] mild symptoms (cough, fever, or others) AND [4] no complications or SOB    Answer Assessment - Initial Assessment Questions  1. COVID-19 DIAGNOSIS: \"Who made your Coronavirus (COVID-19) diagnosis? \" \"Was it confirmed by a positive lab test?\" If not diagnosed by a HCP, ask \"Are there lots of cases (community spread) where you live? \" (See public health department website, if unsure)      no  2. ONSET: \"When did the COVID-19 symptoms start? \"       yesterday  3. WORST SYMPTOM: \"What is your worst symptom? \" (e.g., cough, fever, shortness of breath, muscle aches)      Cough, congestion  4. COUGH: \"Do you have a cough? \" If so, ask: \"How bad is the cough? \"        yes  5. FEVER: \"Do you have a fever? \" If so, ask: \"What is your temperature, how was it measured, and when did it start? \"      97.5F  6. RESPIRATORY STATUS: \"Describe your breathing? \" (e.g., shortness of breath, wheezing, unable to speak)       Sob only when coughing  7. BETTER-SAME-WORSE: Angelique Pennington you getting better, staying the same or getting worse compared to yesterday? \"  If getting worse, ask, \"In what way? \"      worse  8. HIGH RISK DISEASE: \"Do you have any chronic medical problems? \" (e.g., asthma, heart or lung disease, weak immune system, etc.)      no  9. PREGNANCY: \"Is there any chance you are pregnant? \" \"When was your last menstrual period? \"      no  10. OTHER SYMPTOMS: \"Do you have any other symptoms? \"  (e.g., chills, fatigue, headache, loss of smell or taste, muscle pain, sore throat)        no    Protocols used: CORONAVIRUS (COVID-19) DIAGNOSED OR SUSPECTED-ADULT-[place]    Patient called pre-service center [facility]) [address] [facility] with red flag complaint. Brief description of triage: cough, nasal congestion and runny nose. Triage indicates for patient to be seen today in office or PCP call back.      Care advice provided, patient verbalizes understanding; denies any other questions or concerns; instructed to call back for any new or worsening symptoms. Writer provided warm transfer to [facility] at [facility] for appointment scheduling. Attention Provider: Thank you for allowing me to participate in the care of your patient. The patient was connected to triage in response to information provided to the ECC. Please do not respond through this encounter as the response is not directed to a shared pool.

## 2020-09-24 NOTE — TELEPHONE ENCOUNTER
NT transferred pt who stated she has runny nose, mucus, productive yellow cough at times, sinus pressure, sore throat, denies fever now and sob. She did take NyQuil, Advil cold and sinus last night which took fever away.

## 2021-08-27 ENCOUNTER — HOSPITAL ENCOUNTER (OUTPATIENT)
Dept: GENERAL RADIOLOGY | Age: 61
Discharge: HOME | End: 2021-08-27

## 2021-08-27 ENCOUNTER — HOSPITAL ENCOUNTER (OUTPATIENT)
Age: 61
Discharge: HOME | End: 2021-08-27

## 2021-08-27 DIAGNOSIS — S80.01XA CONTUSION OF RIGHT KNEE, INITIAL ENCOUNTER: ICD-10-CM

## 2021-11-11 ENCOUNTER — NURSE ONLY (OUTPATIENT)
Dept: FAMILY MEDICINE CLINIC | Age: 61
End: 2021-11-11
Payer: COMMERCIAL

## 2021-11-11 ENCOUNTER — TELEPHONE (OUTPATIENT)
Dept: FAMILY MEDICINE CLINIC | Age: 61
End: 2021-11-11

## 2021-11-11 DIAGNOSIS — Z20.822 ENCOUNTER FOR SCREENING LABORATORY TESTING FOR COVID-19 VIRUS: Primary | ICD-10-CM

## 2021-11-11 LAB
Lab: ABNORMAL
PERFORMING INSTRUMENT: ABNORMAL
QC PASS/FAIL: ABNORMAL
SARS-COV-2, POC: DETECTED

## 2021-11-11 PROCEDURE — 87426 SARSCOV CORONAVIRUS AG IA: CPT | Performed by: FAMILY MEDICINE

## 2021-11-11 RX ORDER — ONDANSETRON 4 MG/1
4 TABLET, FILM COATED ORAL 3 TIMES DAILY PRN
Qty: 15 TABLET | Refills: 0 | Status: SHIPPED | OUTPATIENT
Start: 2021-11-11

## 2021-11-12 ENCOUNTER — TELEPHONE (OUTPATIENT)
Dept: FAMILY MEDICINE CLINIC | Age: 61
End: 2021-11-12

## 2021-11-12 ENCOUNTER — APPOINTMENT (OUTPATIENT)
Dept: GENERAL RADIOLOGY | Age: 61
End: 2021-11-12
Payer: COMMERCIAL

## 2021-11-12 ENCOUNTER — HOSPITAL ENCOUNTER (EMERGENCY)
Age: 61
Discharge: HOME | End: 2021-11-12
Attending: EMERGENCY MEDICINE
Payer: COMMERCIAL

## 2021-11-12 VITALS
RESPIRATION RATE: 18 BRPM | HEART RATE: 92 BPM | DIASTOLIC BLOOD PRESSURE: 70 MMHG | TEMPERATURE: 98.4 F | OXYGEN SATURATION: 97 % | SYSTOLIC BLOOD PRESSURE: 128 MMHG

## 2021-11-12 DIAGNOSIS — U07.1 COVID-19: Primary | ICD-10-CM

## 2021-11-12 DIAGNOSIS — M54.50 LOW BACK PAIN WITHOUT SCIATICA, UNSPECIFIED BACK PAIN LATERALITY, UNSPECIFIED CHRONICITY: ICD-10-CM

## 2021-11-12 LAB
ALBUMIN SERPL-MCNC: 4.6 G/DL (ref 3.5–5.1)
ALP BLD-CCNC: 104 U/L (ref 38–126)
ALT SERPL-CCNC: 26 U/L (ref 11–66)
ANION GAP SERPL CALCULATED.3IONS-SCNC: 15 MEQ/L (ref 8–16)
AST SERPL-CCNC: 28 U/L (ref 5–40)
BASOPHILS # BLD: 0.2 %
BASOPHILS ABSOLUTE: 0 THOU/MM3 (ref 0–0.1)
BILIRUB SERPL-MCNC: 0.3 MG/DL (ref 0.3–1.2)
BILIRUBIN DIRECT: < 0.2 MG/DL (ref 0–0.3)
BILIRUBIN URINE: NEGATIVE
BLOOD, URINE: NEGATIVE
BUN BLDV-MCNC: 11 MG/DL (ref 7–22)
CALCIUM SERPL-MCNC: 9 MG/DL (ref 8.5–10.5)
CHARACTER, URINE: CLEAR
CHLORIDE BLD-SCNC: 95 MEQ/L (ref 98–111)
CO2: 22 MEQ/L (ref 23–33)
COLOR: YELLOW
CREAT SERPL-MCNC: 0.7 MG/DL (ref 0.4–1.2)
EOSINOPHIL # BLD: 0 %
EOSINOPHILS ABSOLUTE: 0 THOU/MM3 (ref 0–0.4)
ERYTHROCYTE [DISTWIDTH] IN BLOOD BY AUTOMATED COUNT: 13.2 % (ref 11.5–14.5)
ERYTHROCYTE [DISTWIDTH] IN BLOOD BY AUTOMATED COUNT: 47.8 FL (ref 35–45)
GFR SERPL CREATININE-BSD FRML MDRD: 86 ML/MIN/1.73M2
GLUCOSE BLD-MCNC: 95 MG/DL (ref 70–108)
GLUCOSE URINE: NEGATIVE MG/DL
HCT VFR BLD CALC: 45.1 % (ref 37–47)
HEMOGLOBIN: 15.3 GM/DL (ref 12–16)
IMMATURE GRANS (ABS): 0.01 THOU/MM3 (ref 0–0.07)
IMMATURE GRANULOCYTES: 0.2 %
KETONES, URINE: 15
LEUKOCYTE ESTERASE, URINE: NEGATIVE
LIPASE: 21.4 U/L (ref 5.6–51.3)
LYMPHOCYTES # BLD: 19.9 %
LYMPHOCYTES ABSOLUTE: 1.1 THOU/MM3 (ref 1–4.8)
MCH RBC QN AUTO: 33.6 PG (ref 26–33)
MCHC RBC AUTO-ENTMCNC: 33.9 GM/DL (ref 32.2–35.5)
MCV RBC AUTO: 99.1 FL (ref 81–99)
MONOCYTES # BLD: 7.2 %
MONOCYTES ABSOLUTE: 0.4 THOU/MM3 (ref 0.4–1.3)
NITRITE, URINE: NEGATIVE
NUCLEATED RED BLOOD CELLS: 0 /100 WBC
OSMOLALITY CALCULATION: 263.7 MOSMOL/KG (ref 275–300)
PH UA: 6 (ref 5–9)
PLATELET # BLD: 208 THOU/MM3 (ref 130–400)
PMV BLD AUTO: 10 FL (ref 9.4–12.4)
POTASSIUM SERPL-SCNC: 4.3 MEQ/L (ref 3.5–5.2)
PROTEIN UA: NEGATIVE
RBC # BLD: 4.55 MILL/MM3 (ref 4.2–5.4)
REASON FOR REJECTION: NORMAL
REJECTED TEST: NORMAL
SEG NEUTROPHILS: 72.5 %
SEGMENTED NEUTROPHILS ABSOLUTE COUNT: 3.9 THOU/MM3 (ref 1.8–7.7)
SODIUM BLD-SCNC: 132 MEQ/L (ref 135–145)
SPECIFIC GRAVITY, URINE: 1.01 (ref 1–1.03)
TOTAL PROTEIN: 7.4 G/DL (ref 6.1–8)
UROBILINOGEN, URINE: 0.2 EU/DL (ref 0–1)
WBC # BLD: 5.4 THOU/MM3 (ref 4.8–10.8)

## 2021-11-12 PROCEDURE — 71045 X-RAY EXAM CHEST 1 VIEW: CPT

## 2021-11-12 PROCEDURE — 96374 THER/PROPH/DIAG INJ IV PUSH: CPT

## 2021-11-12 PROCEDURE — 82248 BILIRUBIN DIRECT: CPT

## 2021-11-12 PROCEDURE — 81003 URINALYSIS AUTO W/O SCOPE: CPT

## 2021-11-12 PROCEDURE — 6360000002 HC RX W HCPCS

## 2021-11-12 PROCEDURE — 85025 COMPLETE CBC W/AUTO DIFF WBC: CPT

## 2021-11-12 PROCEDURE — 80053 COMPREHEN METABOLIC PANEL: CPT

## 2021-11-12 PROCEDURE — 36415 COLL VENOUS BLD VENIPUNCTURE: CPT

## 2021-11-12 PROCEDURE — 2580000003 HC RX 258: Performed by: NURSE PRACTITIONER

## 2021-11-12 PROCEDURE — 83690 ASSAY OF LIPASE: CPT

## 2021-11-12 PROCEDURE — 6370000000 HC RX 637 (ALT 250 FOR IP): Performed by: NURSE PRACTITIONER

## 2021-11-12 PROCEDURE — 99283 EMERGENCY DEPT VISIT LOW MDM: CPT

## 2021-11-12 RX ORDER — KETOROLAC TROMETHAMINE 30 MG/ML
INJECTION, SOLUTION INTRAMUSCULAR; INTRAVENOUS
Status: COMPLETED
Start: 2021-11-12 | End: 2021-11-12

## 2021-11-12 RX ORDER — ACETAMINOPHEN 325 MG/1
650 TABLET ORAL ONCE
Status: COMPLETED | OUTPATIENT
Start: 2021-11-12 | End: 2021-11-12

## 2021-11-12 RX ORDER — CYCLOBENZAPRINE HCL 10 MG
10 TABLET ORAL 2 TIMES DAILY PRN
Qty: 10 TABLET | Refills: 0 | Status: SHIPPED | OUTPATIENT
Start: 2021-11-12

## 2021-11-12 RX ORDER — KETOROLAC TROMETHAMINE 30 MG/ML
15 INJECTION, SOLUTION INTRAMUSCULAR; INTRAVENOUS ONCE
Status: COMPLETED | OUTPATIENT
Start: 2021-11-12 | End: 2021-11-12

## 2021-11-12 RX ORDER — LIDOCAINE 50 MG/G
1 PATCH TOPICAL DAILY
Qty: 5 PATCH | Refills: 0 | Status: SHIPPED | OUTPATIENT
Start: 2021-11-12 | End: 2021-11-17

## 2021-11-12 RX ORDER — IBUPROFEN 600 MG/1
600 TABLET ORAL EVERY 8 HOURS PRN
Qty: 10 TABLET | Refills: 0 | Status: SHIPPED | OUTPATIENT
Start: 2021-11-12

## 2021-11-12 RX ORDER — 0.9 % SODIUM CHLORIDE 0.9 %
1000 INTRAVENOUS SOLUTION INTRAVENOUS ONCE
Status: COMPLETED | OUTPATIENT
Start: 2021-11-12 | End: 2021-11-12

## 2021-11-12 RX ORDER — CYCLOBENZAPRINE HCL 10 MG
10 TABLET ORAL ONCE
Status: DISCONTINUED | OUTPATIENT
Start: 2021-11-12 | End: 2021-11-12 | Stop reason: HOSPADM

## 2021-11-12 RX ORDER — ONDANSETRON 4 MG/1
4 TABLET, ORALLY DISINTEGRATING ORAL ONCE
Status: COMPLETED | OUTPATIENT
Start: 2021-11-12 | End: 2021-11-12

## 2021-11-12 RX ADMIN — SODIUM CHLORIDE 1000 ML: 9 INJECTION, SOLUTION INTRAVENOUS at 15:08

## 2021-11-12 RX ADMIN — KETOROLAC TROMETHAMINE 15 MG: 30 INJECTION, SOLUTION INTRAMUSCULAR; INTRAVENOUS at 16:36

## 2021-11-12 RX ADMIN — ACETAMINOPHEN 650 MG: 325 TABLET ORAL at 15:02

## 2021-11-12 RX ADMIN — ONDANSETRON 4 MG: 4 TABLET, ORALLY DISINTEGRATING ORAL at 13:55

## 2021-11-12 ASSESSMENT — PAIN SCALES - GENERAL: PAINLEVEL_OUTOF10: 8

## 2021-11-12 NOTE — TELEPHONE ENCOUNTER
Daughter Nicolette called office regarding patient status. Patient was swabbed yesterday for Covid at office which came back positive. Zofran was sent to pharmacy yesterday for nausea and vomiting. No relief with Zofran. Patient is having severe body aches with no relief from otc Tylenol. Denies cough, fever, or shortness of breathe. Daughter is wanting to know if the patient could have the infusion. Discussed with the daughter the patient has not been seen in over a year and both my providers are out of the office today. Will try to contact provider for recommendations. Daughter verbalized understanding and will wait for return call.

## 2021-11-12 NOTE — TELEPHONE ENCOUNTER
Instructions given to the patient to go to the ER for evaluation per 's order. Dehydration is a concern with the nausea and vomiting -- patient may need fluids. Patient verbalized understanding. Confirmed appointment scheduled for Monday. Patient will notify office if anything changes.

## 2021-11-12 NOTE — ED NOTES
Medicated per orders. Resp regular. Denies other needs. Call light in reach.       Tori Sanchez RN  11/12/21 [id number]

## 2021-11-12 NOTE — ED NOTES
Assessment completed. Resp regular. Denies other needs. Family at side. Call light in reach.       Sherry Christie RN  11/12/21 [id number]

## 2021-11-14 ASSESSMENT — ENCOUNTER SYMPTOMS
COUGH: 1
WHEEZING: 0
COLOR CHANGE: 0
ABDOMINAL DISTENTION: 0
RHINORRHEA: 0
CONSTIPATION: 0
NAUSEA: 1
SORE THROAT: 0
DIARRHEA: 0
VOMITING: 1
SHORTNESS OF BREATH: 0

## 2021-11-14 NOTE — ED PROVIDER NOTES
[facility] ENCOUNTER          Pt Name: Wallace Apodaca  MRN: [id number]  [place] 1960  Date of evaluation: 11/12/2021  Treating Resident Physician: Monika Montes MD  Supervising Physician: Joceline Juarez DO    CHIEF COMPLAINT       Chief Complaint   Patient presents with    Positive For Covid-19    Back Pain     right side    Emesis     History obtained from the patient. HISTORY OF PRESENT ILLNESS    HPI  Wallace Apodaca is a 63 y.o. female who presents to the emergency department for evaluation of vomiting. Patient states that she has had symptoms for 3 days. Patient had positive Covid test 4 days ago. Patient denies any abdomen pain diarrhea or constipation. Did have fever at home. Patient denies any shortness of breath chest pain or syncope. The patient has no other acute complaints at this time. REVIEW OF SYSTEMS   Review of Systems   Constitutional: Positive for fatigue and fever. HENT: Negative for ear pain, rhinorrhea and sore throat. Respiratory: Positive for cough. Negative for shortness of breath and wheezing. Cardiovascular: Negative for chest pain, palpitations and leg swelling. Gastrointestinal: Positive for nausea and vomiting. Negative for abdominal distention, constipation and diarrhea. Endocrine: Negative for polydipsia and polyuria. Genitourinary: Negative for difficulty urinating and dysuria. Musculoskeletal: Negative for arthralgias. Skin: Negative for color change, pallor and rash. Neurological: Negative for dizziness, seizures, syncope, weakness and numbness.          PAST MEDICAL AND SURGICAL HISTORY     Past Medical History:   Diagnosis Date    Cancer [facility]) about 24 yrs ago    had cervical cancer- surgery in [address] [place] [address] Sessile colonic polyp 12/2017    Sessile rectal polyp 12/2017     Past Surgical History:   Procedure Laterality Date    CERVIX REMOVAL  24 years ago approx    in [facility]    COLONOSCOPY  2017    TUBAL [facility]ATION  1993    [facility]         MEDICATIONS   No current facility-administered medications for this encounter.     Current Outpatient Medications:     lidocaine (LIDODERM) 5 %, Place 1 patch onto the skin daily for 5 days 12 hours on, 12 hours off., Disp: 5 patch, Rfl: 0    ibuprofen (ADVIL;MOTRIN) 600 MG tablet, Take 1 tablet by mouth every 8 hours as needed for Pain, Disp: 10 tablet, Rfl: 0    cyclobenzaprine (FLEXERIL) 10 MG tablet, Take 1 tablet by mouth 2 times daily as needed for Muscle spasms, Disp: 10 tablet, Rfl: 0    ondansetron (ZOFRAN) 4 MG tablet, Take 1 tablet by mouth 3 times daily as needed for Nausea or Vomiting, Disp: 15 tablet, Rfl: 0    Dextromethorphan-guaiFENesin  MG TB12, Take 1 tablet by mouth 2 times daily, Disp: 14 tablet, Rfl: 0    fluticasone (FLONASE) 50 MCG/ACT nasal spray, 1 spray by Each Nostril route daily, Disp: 1 Bottle, Rfl: 0    alendronate (FOSAMAX) 70 MG tablet, Take 1 tablet by mouth every 7 days, Disp: 12 tablet, Rfl: 1      SOCIAL HISTORY     Social History     Social History Narrative    Not on file     Social History     Tobacco Use    Smoking status: Current Every Day Smoker     Packs/day: 1.00     Years: 40.00     Pack years: 40.00     Types: Cigarettes    Smokeless tobacco: Never Used    Tobacco comment: has tried vapor cigs   Substance Use Topics    Alcohol use: Yes     Comment: rare    Drug use: No         ALLERGIES   No Known Allergies      FAMILY HISTORY     Family History   Problem Relation Age of Onset    Diabetes Mother     Heart Disease Mother         chf    Diabetes Father     Heart Attack Father 80    Cancer Sister 46        breast cancer- spread    Breast Cancer Sister 58        recurrence age 72    Diabetes Brother         Type 2    Cervical Cancer Sister 32    Cervical Cancer Other 32    Cervical Cancer Child 28    Colon Cancer Neg Hx     Colon Polyps Neg Hx          PREVIOUS RECORDS Previous records reviewed: today    PHYSICAL EXAM     ED Triage Vitals   BP Temp Temp src Pulse Resp SpO2 Height Weight   11/12/21 1252 11/12/21 1253 -- 11/12/21 1252 11/12/21 1252 11/12/21 1252 -- --   132/67 98.4 °F (36.9 °C)  98 16 96 %       Initial vital signs and nursing assessment reviewed and normal. There is no height or weight on file to calculate BMI. Pulsoximetry is normal per my interpretation. Additional Vital Signs:  Vitals:    11/12/21 1637   BP: 128/70   Pulse: 92   Resp: 18   Temp:    SpO2: 97%       Physical Exam  Constitutional:       Appearance: Normal appearance. HENT:      Head: Normocephalic. Right Ear: External ear normal.      Left Ear: External ear normal.      Nose: Nose normal.      Mouth/Throat:      Mouth: Mucous membranes are moist.      Pharynx: Oropharynx is clear. Eyes:      Extraocular Movements: Extraocular movements intact. Conjunctiva/sclera: Conjunctivae normal.      Pupils: Pupils are equal, round, and reactive to light. Cardiovascular:      Rate and Rhythm: Normal rate and regular rhythm. Pulses: Normal pulses. Heart sounds: Normal heart sounds. Pulmonary:      Effort: Pulmonary effort is normal.      Breath sounds: Normal breath sounds. Abdominal:      General: Bowel sounds are normal.      Palpations: Abdomen is soft. Musculoskeletal:         General: Normal range of motion. Cervical back: Normal range of motion and neck supple. Skin:     General: Skin is warm and dry. Capillary Refill: Capillary refill takes less than 2 seconds. Neurological:      General: No focal deficit present. Mental Status: She is alert and oriented to person, place, and time. MEDICAL DECISION MAKING   Initial Assessment:   She has had symptoms of cough and nausea intermittently for the last 4 days patient started having worsening of symptoms 3 days ago. Patient was diagnosed with COVID-19 3 days ago.   States denies any shortness of breath or chest pain. Patient does states she is continuing to have nausea, vomiting, and right lower back pain. Patient differential diagnosis please but is not limited to UTI, dehydration, COVID-19, pneumonia, PE musculoskeletal pain, abdominal aortic dissection, ureterolithiasis, and low back pain. Plan:     Labs  EKG  Zofran  Cardiac monitor          ED RESULTS   Laboratory results:  Labs Reviewed   CBC WITH AUTO DIFFERENTIAL - Abnormal; Notable for the following components:       Result Value    MCV 99.1 (*)     MCH 33.6 (*)     RDW-SD 47.8 (*)     All other components within normal limits   URINE RT REFLEX TO CULTURE - Abnormal; Notable for the following components:    Ketones, Urine 15 (*)     All other components within normal limits   BASIC METABOLIC PANEL - Abnormal; Notable for the following components:    Sodium 132 (*)     Chloride 95 (*)     CO2 22 (*)     All other components within normal limits   GLOMERULAR FILTRATION RATE, ESTIMATED - Abnormal; Notable for the following components:    Est, Glom Filt Rate 86 (*)     All other components within normal limits   OSMOLALITY - Abnormal; Notable for the following components:    Osmolality Calc 263.7 (*)     All other components within normal limits   SPECIMEN REJECTION   HEPATIC FUNCTION PANEL   LIPASE   ANION GAP       Radiologic studies results:  XR CHEST PORTABLE   Final Result   1. No acute cardiopulmonary process. **This report has been created using voice recognition software. It may contain minor errors which are inherent in voice recognition technology. **      Final report electronically signed by Dr Brian Trujillo on 11/12/2021 2:12 PM          ED Medications administered this visit:   Medications   ondansetron (ZOFRAN-ODT) disintegrating tablet 4 mg (4 mg Oral Given 11/12/21 1355)   0.9 % sodium chloride bolus (0 mLs IntraVENous Stopped 11/12/21 1638)   acetaminophen (TYLENOL) tablet 650 mg (650 mg Oral Given 11/12/21 1502) ketorolac (TORADOL) injection 15 mg (15 mg IntraVENous Given 11/12/21 1636)         ED COURSE         Patient is alert and tolerating fluids. Discussed in great detail supportive care with patient. Patient verbalized good understanding. Strict return precautions and follow up instructions were discussed with the patient prior to discharge, with which the patient agrees. MEDICATION CHANGES     Discharge Medication List as of 11/12/2021  4:47 PM      START taking these medications    Details   lidocaine (LIDODERM) 5 % Place 1 patch onto the skin daily for 5 days 12 hours on, 12 hours off., Disp-5 patch, R-0Normal      ibuprofen (ADVIL;MOTRIN) 600 MG tablet Take 1 tablet by mouth every 8 hours as needed for Pain, Disp-10 tablet, R-0Normal      cyclobenzaprine (FLEXERIL) 10 MG tablet Take 1 tablet by mouth 2 times daily as needed for Muscle spasms, Disp-10 tablet, R-0Normal               FINAL DISPOSITION     Final diagnoses:   COVID-19   Low back pain without sciatica, unspecified back pain laterality, unspecified chronicity     Condition: condition: good  Dispo: Discharge to home      This transcription was electronically signed. Parts of this transcriptions may have been dictated by use of voice recognition software and electronically transcribed, and parts may have been transcribed with the assistance of an ED scribe. The transcription may contain errors not detected in proofreading. Please refer to my supervising physician's documentation if my documentation differs.     Electronically Signed: Laura Gomez MD, 11/14/21, 10:37 AM        Laura Gomez MD  Resident  11/15/21 2020

## 2021-11-15 ENCOUNTER — CARE COORDINATION (OUTPATIENT)
Dept: CARE COORDINATION | Age: 61
End: 2021-11-15

## 2021-11-15 ENCOUNTER — VIRTUAL VISIT (OUTPATIENT)
Dept: FAMILY MEDICINE CLINIC | Age: 61
End: 2021-11-15
Payer: COMMERCIAL

## 2021-11-15 DIAGNOSIS — U07.1 COVID-19: Primary | ICD-10-CM

## 2021-11-15 PROCEDURE — 99212 OFFICE O/P EST SF 10 MIN: CPT | Performed by: FAMILY MEDICINE

## 2021-11-15 PROCEDURE — 3017F COLORECTAL CA SCREEN DOC REV: CPT | Performed by: FAMILY MEDICINE

## 2021-11-15 PROCEDURE — G8428 CUR MEDS NOT DOCUMENT: HCPCS | Performed by: FAMILY MEDICINE

## 2021-11-15 NOTE — PROGRESS NOTES
11/15/2021    TELEHEALTH EVALUATION -- Audio/Visual (During NJIBQ-77 public health emergency)    HPI:    Lyndsay Bray (:  1960) has requested an audio/video evaluation for the following concern(s):  Covid 19    Symptoms started 2021 with nausea and vomiting. Covid test positive 2021. Went to ER 2021  She denies any chest pain, SOB, runny nose, sore throat. No respiratory symptoms at all. Her symptoms were mainly GI. She was exposed to Covid (daughter and grand daughter), then her  and her son. She is not vaccinated. Review of Systems:  Denies fever or chills. Denies congestion, drainage, sore throat, ear pain. Positive for nausea and vomiting, much improved with Zofran. Negative for abdominal pain. Negative for loss of taste or smell. Prior to Visit Medications    Medication Sig Taking? Authorizing Provider   lidocaine (LIDODERM) 5 % Place 1 patch onto the skin daily for 5 days 12 hours on, 12 hours off.   Piedad Edgar MD   ibuprofen (ADVIL;MOTRIN) 600 MG tablet Take 1 tablet by mouth every 8 hours as needed for Pain  Kia Lechuga MD   cyclobenzaprine (FLEXERIL) 10 MG tablet Take 1 tablet by mouth 2 times daily as needed for Muscle spasms  Kia Lechuga MD   ondansetron (ZOFRAN) 4 MG tablet Take 1 tablet by mouth 3 times daily as needed for Nausea or Vomiting  Vincent Maria MD   Dextromethorphan-guaiFENesin  MG TB12 Take 1 tablet by mouth 2 times daily  MARLENA Costello CNP   fluticasone (FLONASE) 50 MCG/ACT nasal spray 1 spray by Each Nostril route daily  MARLENA Costello CNP       Social History     Tobacco Use    Smoking status: Current Every Day Smoker     Packs/day: 1.00     Years: 40.00     Pack years: 40.00     Types: Cigarettes    Smokeless tobacco: Never Used    Tobacco comment: has tried vapor cigs   Substance Use Topics    Alcohol use: Yes     Comment: rare    Drug use: No        Past Medical History:   Diagnosis Date    Cancer [facility]) about 24 yrs ago    had cervical cancer- surgery in [address] [place] [address] Sessile colonic polyp 12/2017    Sessile rectal polyp 12/2017       PHYSICAL EXAMINATION:    Constitutional: [x] Appears well-developed and well-nourished [x] No apparent distress        Mental status  [x] Alert and awake  [x] Oriented to person/place/time [x]Able to follow commands      Eyes:  EOM    [x]  Normal      HENT:   [x] Normocephalic, atraumatic. [x] Mouth/Throat: Mucous membranes are moist.     External Ears [x] Normal      Neck: [x] No visualized mass     Pulmonary/Chest: [x] Respiratory effort normal.  [x] No visualized signs of difficulty breathing or respiratory distress          Psychiatric:       [x] Normal Affect     ASSESSMENT/PLAN:  1. COVID-19  She had mild Covid, mainly with gastrointestinal symptoms the seems to be well controlled. Continue to monitor symptoms and call if worsening of the symptoms. I gave her verbal information about monoclonal antibodies if needed. Her risk factor is her BMI 32.92. Return in about 5 weeks (around 12/20/2021) for Wellness Visit. Cbsujataleila Bray, was evaluated through a synchronous (real-time) audio-video encounter. The patient (or guardian if applicable) is aware that this is a billable service. Verbal consent to proceed has been obtained within the past 12 months. The visit was conducted pursuant to the emergency declaration under the [facility], [address] [place] [address] and the [employer] and [employer] General Act. Patient identification was verified, and a caregiver was present when appropriate. The patient was located in a state where the provider was credentialed to provide care. Total time spent on this encounter: Not billed by time    --Vincent Maria MD on 11/15/2021 at 6:06 PM    An electronic signature was used to authenticate this note.